# Patient Record
Sex: MALE | Race: WHITE | HISPANIC OR LATINO | Employment: FULL TIME | URBAN - METROPOLITAN AREA
[De-identification: names, ages, dates, MRNs, and addresses within clinical notes are randomized per-mention and may not be internally consistent; named-entity substitution may affect disease eponyms.]

---

## 2017-11-25 ENCOUNTER — WALK IN (OUTPATIENT)
Dept: URGENT CARE | Age: 57
End: 2017-11-25

## 2017-11-25 VITALS — OXYGEN SATURATION: 97 % | TEMPERATURE: 98.3 F | HEART RATE: 87 BPM

## 2017-11-25 DIAGNOSIS — J02.9 PHARYNGITIS, UNSPECIFIED ETIOLOGY: Primary | ICD-10-CM

## 2017-11-25 LAB — S PYO AG THROAT QL: POSITIVE

## 2017-11-25 PROCEDURE — 99213 OFFICE O/P EST LOW 20 MIN: CPT | Performed by: NURSE PRACTITIONER

## 2017-11-25 PROCEDURE — 87880 STREP A ASSAY W/OPTIC: CPT | Performed by: NURSE PRACTITIONER

## 2017-11-25 RX ORDER — PENICILLIN V POTASSIUM 500 MG/1
500 TABLET ORAL 2 TIMES DAILY
Qty: 20 TABLET | Refills: 0 | Status: SHIPPED | OUTPATIENT
Start: 2017-11-25 | End: 2017-12-05

## 2018-02-06 ENCOUNTER — LAB SERVICES (OUTPATIENT)
Dept: OTHER | Age: 58
End: 2018-02-06

## 2018-02-06 ENCOUNTER — CHARTING TRANS (OUTPATIENT)
Dept: OTHER | Age: 58
End: 2018-02-06

## 2018-02-06 ASSESSMENT — PAIN SCALES - GENERAL: PAINLEVEL_OUTOF10: 0

## 2018-02-19 ENCOUNTER — CHARTING TRANS (OUTPATIENT)
Dept: OTHER | Age: 58
End: 2018-02-19

## 2018-02-19 LAB
ANION GAP SERPL CALC-SCNC: 13 MMOL/L (ref 10–20)
BUN SERPL-MCNC: 14 MG/DL (ref 6–20)
BUN/CREAT SERPL: 13 (ref 7–25)
CALCIUM SERPL-MCNC: 9.3 MG/DL (ref 8.4–10.2)
CHLORIDE SERPL-SCNC: 105 MMOL/L (ref 98–107)
CO2 SERPL-SCNC: 28 MMOL/L (ref 21–32)
CREAT SERPL-MCNC: 1.05 MG/DL (ref 0.67–1.17)
CREATININE RANDOM URINE: 215 MG/DL
GLUCOSE SERPL-MCNC: 86 MG/DL (ref 65–99)
HBA1C MFR BLD: 5.9 % (ref 4.5–5.6)
LENGTH OF FAST TIME PATIENT: 12 HRS
MICROALBUMIN UR-MCNC: 15.8 MG/DL
MICROALBUMIN/CREAT UR: 73.5 MCG/MG
POTASSIUM SERPL-SCNC: 4.4 MMOL/L (ref 3.4–5.1)
PSA SERPL-MCNC: 5.17 NG/ML
SODIUM SERPL-SCNC: 142 MMOL/L (ref 135–145)

## 2018-02-19 ASSESSMENT — PAIN SCALES - GENERAL: PAINLEVEL_OUTOF10: 0

## 2018-03-01 ENCOUNTER — CHARTING TRANS (OUTPATIENT)
Dept: OTHER | Age: 58
End: 2018-03-01

## 2018-05-01 ENCOUNTER — CHARTING TRANS (OUTPATIENT)
Dept: OTHER | Age: 58
End: 2018-05-01

## 2018-12-02 VITALS
HEART RATE: 100 BPM | BODY MASS INDEX: 30.53 KG/M2 | WEIGHT: 190 LBS | DIASTOLIC BLOOD PRESSURE: 78 MMHG | HEIGHT: 66 IN | SYSTOLIC BLOOD PRESSURE: 102 MMHG

## 2018-12-02 VITALS
TEMPERATURE: 96.4 F | WEIGHT: 195.77 LBS | HEART RATE: 80 BPM | DIASTOLIC BLOOD PRESSURE: 110 MMHG | TEMPERATURE: 96.3 F | OXYGEN SATURATION: 98 % | SYSTOLIC BLOOD PRESSURE: 160 MMHG | DIASTOLIC BLOOD PRESSURE: 100 MMHG | SYSTOLIC BLOOD PRESSURE: 155 MMHG | RESPIRATION RATE: 18 BRPM | OXYGEN SATURATION: 97 % | RESPIRATION RATE: 18 BRPM | HEIGHT: 66 IN | HEART RATE: 76 BPM

## 2018-12-02 VITALS
BODY MASS INDEX: 31.1 KG/M2 | DIASTOLIC BLOOD PRESSURE: 110 MMHG | HEIGHT: 66 IN | HEART RATE: 84 BPM | SYSTOLIC BLOOD PRESSURE: 176 MMHG | WEIGHT: 193.5 LBS

## 2025-02-21 ENCOUNTER — HOSPITAL ENCOUNTER (EMERGENCY)
Facility: HOSPITAL | Age: 65
Discharge: HOME | End: 2025-02-21
Attending: EMERGENCY MEDICINE
Payer: COMMERCIAL

## 2025-02-21 ENCOUNTER — APPOINTMENT (OUTPATIENT)
Dept: GENERAL RADIOLOGY | Facility: HOSPITAL | Age: 65
End: 2025-02-21
Attending: EMERGENCY MEDICINE
Payer: COMMERCIAL

## 2025-02-21 VITALS
OXYGEN SATURATION: 95 % | RESPIRATION RATE: 14 BRPM | WEIGHT: 180 LBS | DIASTOLIC BLOOD PRESSURE: 108 MMHG | HEART RATE: 66 BPM | SYSTOLIC BLOOD PRESSURE: 169 MMHG | BODY MASS INDEX: 29.99 KG/M2 | HEIGHT: 65 IN | TEMPERATURE: 98 F

## 2025-02-21 DIAGNOSIS — R03.0 ELEVATED BLOOD PRESSURE READING: Primary | ICD-10-CM

## 2025-02-21 LAB
ALBUMIN SERPL-MCNC: 4.5 G/DL (ref 3.2–4.8)
ALBUMIN/GLOB SERPL: 1.3 {RATIO} (ref 1–2)
ALP LIVER SERPL-CCNC: 123 U/L
ALT SERPL-CCNC: 11 U/L
ANION GAP SERPL CALC-SCNC: 9 MMOL/L (ref 0–18)
AST SERPL-CCNC: 15 U/L (ref ?–34)
ATRIAL RATE: 75 BPM
BASOPHILS # BLD AUTO: 0.05 X10(3) UL (ref 0–0.2)
BASOPHILS NFR BLD AUTO: 0.6 %
BILIRUB SERPL-MCNC: 0.3 MG/DL (ref 0.2–1.1)
BUN BLD-MCNC: 24 MG/DL (ref 9–23)
BUN/CREAT SERPL: 19.4 (ref 10–20)
CALCIUM BLD-MCNC: 9.1 MG/DL (ref 8.7–10.4)
CHLORIDE SERPL-SCNC: 106 MMOL/L (ref 98–112)
CO2 SERPL-SCNC: 26 MMOL/L (ref 21–32)
CREAT BLD-MCNC: 1.24 MG/DL
DEPRECATED RDW RBC AUTO: 43.6 FL (ref 35.1–46.3)
EGFRCR SERPLBLD CKD-EPI 2021: 65 ML/MIN/1.73M2 (ref 60–?)
EOSINOPHIL # BLD AUTO: 0.17 X10(3) UL (ref 0–0.7)
EOSINOPHIL NFR BLD AUTO: 2.2 %
ERYTHROCYTE [DISTWIDTH] IN BLOOD BY AUTOMATED COUNT: 13.3 % (ref 11–15)
GLOBULIN PLAS-MCNC: 3.4 G/DL (ref 2–3.5)
GLUCOSE BLD-MCNC: 94 MG/DL (ref 70–99)
HCT VFR BLD AUTO: 49.8 %
HGB BLD-MCNC: 16.1 G/DL
IMM GRANULOCYTES # BLD AUTO: 0.03 X10(3) UL (ref 0–1)
IMM GRANULOCYTES NFR BLD: 0.4 %
LYMPHOCYTES # BLD AUTO: 1.53 X10(3) UL (ref 1–4)
LYMPHOCYTES NFR BLD AUTO: 19.4 %
MCH RBC QN AUTO: 28.5 PG (ref 26–34)
MCHC RBC AUTO-ENTMCNC: 32.3 G/DL (ref 31–37)
MCV RBC AUTO: 88.3 FL
MONOCYTES # BLD AUTO: 0.75 X10(3) UL (ref 0.1–1)
MONOCYTES NFR BLD AUTO: 9.5 %
NEUTROPHILS # BLD AUTO: 5.35 X10 (3) UL (ref 1.5–7.7)
NEUTROPHILS # BLD AUTO: 5.35 X10(3) UL (ref 1.5–7.7)
NEUTROPHILS NFR BLD AUTO: 67.9 %
OSMOLALITY SERPL CALC.SUM OF ELEC: 296 MOSM/KG (ref 275–295)
P AXIS: 72 DEGREES
P-R INTERVAL: 142 MS
PLATELET # BLD AUTO: 296 10(3)UL (ref 150–450)
POTASSIUM SERPL-SCNC: 3.9 MMOL/L (ref 3.5–5.1)
PROT SERPL-MCNC: 7.9 G/DL (ref 5.7–8.2)
Q-T INTERVAL: 374 MS
QRS DURATION: 78 MS
QTC CALCULATION (BEZET): 417 MS
R AXIS: 3 DEGREES
RBC # BLD AUTO: 5.64 X10(6)UL
SODIUM SERPL-SCNC: 141 MMOL/L (ref 136–145)
T AXIS: 245 DEGREES
TROPONIN I SERPL HS-MCNC: 23 NG/L
VENTRICULAR RATE: 75 BPM
WBC # BLD AUTO: 7.9 X10(3) UL (ref 4–11)

## 2025-02-21 PROCEDURE — 85025 COMPLETE CBC W/AUTO DIFF WBC: CPT | Performed by: EMERGENCY MEDICINE

## 2025-02-21 PROCEDURE — 71045 X-RAY EXAM CHEST 1 VIEW: CPT | Performed by: EMERGENCY MEDICINE

## 2025-02-21 PROCEDURE — 93010 ELECTROCARDIOGRAM REPORT: CPT

## 2025-02-21 PROCEDURE — 36415 COLL VENOUS BLD VENIPUNCTURE: CPT

## 2025-02-21 PROCEDURE — 80053 COMPREHEN METABOLIC PANEL: CPT | Performed by: EMERGENCY MEDICINE

## 2025-02-21 PROCEDURE — 99284 EMERGENCY DEPT VISIT MOD MDM: CPT

## 2025-02-21 PROCEDURE — 84484 ASSAY OF TROPONIN QUANT: CPT | Performed by: EMERGENCY MEDICINE

## 2025-02-21 PROCEDURE — 93005 ELECTROCARDIOGRAM TRACING: CPT

## 2025-02-21 PROCEDURE — 99285 EMERGENCY DEPT VISIT HI MDM: CPT

## 2025-02-21 RX ORDER — AMLODIPINE BESYLATE 5 MG/1
5 TABLET ORAL ONCE
Status: COMPLETED | OUTPATIENT
Start: 2025-02-21 | End: 2025-02-21

## 2025-02-21 RX ORDER — AMLODIPINE BESYLATE 5 MG/1
5 TABLET ORAL DAILY
Qty: 5 TABLET | Refills: 0 | Status: SHIPPED | OUTPATIENT
Start: 2025-02-21 | End: 2025-02-26

## 2025-02-21 NOTE — ED PROVIDER NOTES
[facility]  Emergency Department Attending Note     Chief Complaint:   Chief Complaint   Patient presents with    Hypertension     HISTORY OF PRESENT ILLNESS:   Cory Turner is a 64 year old male who presents to the ED with medical history including hypertension noncompliant on all medications for at least 9 months presents with a complaint of elevated blood pressure at home.  Patient states he checked his blood pressure at home and it was quite elevated but cannot remember what the reading was exactly.  Triage complaint mentions headache and dizziness however the patient denies this to me.  He states that in the past he has had lightheadedness spells but nothing recent.  She denies any chest pain or dyspnea pleuritic or exertional complaints.  Denies any somatic complaint at this time.  Feels well.     MEDICAL & SOCIAL HISTORY:   Past Medical History:    Essential hypertension    History reviewed. No pertinent surgical history.   Social History     Socioeconomic History    Marital status: Single    Allergies[1]   Current Outpatient Medications   Medication Sig Dispense Refill    amLODIPine 5 MG Oral Tab Take 1 tablet (5 mg total) by mouth daily for 5 days. 5 tablet 0          REVIEW OF SYSTEMS   A 10 point review of systems was completed and is negative except as listed in history of present illness      PHYSICAL EXAM   Vitals: BP (!) 152/103   Pulse 74   Temp 98.2 °F (36.8 °C) (Oral)   Resp 18   Ht 165.1 cm (5' 5\")   Wt 81.6 kg   SpO2 94%   BMI 29.95 kg/m²   BP (!) 152/103   Pulse 74   Temp 98.2 °F (36.8 °C) (Oral)   Resp 18   Ht 165.1 cm (5' 5\")   Wt 81.6 kg   SpO2 94%   BMI 29.95 kg/m²     General: A&Ox3, NAD  Constitutional: Well developed, well nourished, nontoxic  Head: atraumatic, normocephalic   Eyes: conjuctiva clear, no icterus, PERRL, EOMI, vision grossly normal  Ears: normal external appearance, no drainage  Nose:  Atraumatic, no swelling, no drainage, nares patent  Throat:  Moist  pink mucous membranes, airway is patent  Neck:  Soft supple, no masses, no tracheal deviation, no stridor  Chest:  No bruising or abrasions, no tenderness, no deformity  Cardiac:  Regular rate and rhythm  Lung:  No distress, no retractions. Clear to auscultation bilaterally, no w/r/r  Abdomen:  Soft, nontender, nondistended, normal BS  Back: No stepoff/deformity  Extremities: FROM all ext, no deformities, intact equal peripheral pulses, no cyanosis or edema  Neuro: No facial droop, no slurred speech, moving all extremities freely, SILT to the bilateral upper and lower extremities  Psych: A&Ox3, normal affect, cooperative, calm  Skin: No rash, no petechiae/purpura, warm, dry      RESULTS  LABS:   Results for orders placed or performed during the hospital encounter of 02/21/25   EKG 12 Lead    Collection Time: 02/21/25  9:04 AM   Result Value Ref Range    Ventricular rate 75 BPM    Atrial rate 75 BPM    P-R Interval 142 ms    QRS Duration 78 ms    Q-T Interval 374 ms    QTC Calculation (Bezet) 417 ms    P Axis 72 degrees    R Axis 3 degrees    T Axis 245 degrees   Comp Metabolic Panel (14)    Collection Time: 02/21/25  9:37 AM   Result Value Ref Range    Glucose 94 70 - 99 mg/dL    Sodium 141 136 - 145 mmol/L    Potassium 3.9 3.5 - 5.1 mmol/L    Chloride 106 98 - 112 mmol/L    CO2 26.0 21.0 - 32.0 mmol/L    Anion Gap 9 0 - 18 mmol/L    BUN 24 (H) 9 - 23 mg/dL    Creatinine 1.24 0.70 - 1.30 mg/dL    BUN/CREA Ratio 19.4 10.0 - 20.0    Calcium, Total 9.1 8.7 - 10.4 mg/dL    Calculated Osmolality 296 (H) 275 - 295 mOsm/kg    eGFR-Cr 65 >=60 mL/min/1.73m2    ALT 11 10 - 49 U/L    AST 15 <34 U/L    Alkaline Phosphatase 123 (H) 45 - 117 U/L    Bilirubin, Total 0.3 0.2 - 1.1 mg/dL    Total Protein 7.9 5.7 - 8.2 g/dL    Albumin 4.5 3.2 - 4.8 g/dL    Globulin  3.4 2.0 - 3.5 g/dL    A/G Ratio 1.3 1.0 - 2.0   CBC With Differential With Platelet    Collection Time: 02/21/25  9:37 AM   Result Value Ref Range    WBC 7.9 4.0 - 11.0  x10(3) uL    RBC 5.64 3.80 - 5.80 x10(6)uL    HGB 16.1 13.0 - 17.5 g/dL    HCT 49.8 39.0 - 53.0 %    MCV 88.3 80.0 - 100.0 fL    MCH 28.5 26.0 - 34.0 pg    MCHC 32.3 31.0 - 37.0 g/dL    RDW-SD 43.6 35.1 - 46.3 fL    RDW 13.3 11.0 - 15.0 %    .0 150.0 - 450.0 10(3)uL    Neutrophil Absolute Prelim 5.35 1.50 - 7.70 x10 (3) uL    Neutrophil Absolute 5.35 1.50 - 7.70 x10(3) uL    Lymphocyte Absolute 1.53 1.00 - 4.00 x10(3) uL    Monocyte Absolute 0.75 0.10 - 1.00 x10(3) uL    Eosinophil Absolute 0.17 0.00 - 0.70 x10(3) uL    Basophil Absolute 0.05 0.00 - 0.20 x10(3) uL    Immature Granulocyte Absolute 0.03 0.00 - 1.00 x10(3) uL    Neutrophil % 67.9 %    Lymphocyte % 19.4 %    Monocyte % 9.5 %    Eosinophil % 2.2 %    Basophil % 0.6 %    Immature Granulocyte % 0.4 %   Troponin I (High Sensitivity)    Collection Time: 02/21/25  9:37 AM   Result Value Ref Range    Troponin I (High Sensitivity) 23 <=53 ng/L   RAINBOW DRAW LAVENDER    Collection Time: 02/21/25  9:37 AM   Result Value Ref Range    Hold Lavender Auto Resulted    RAINBOW DRAW LIGHT GREEN    Collection Time: 02/21/25  9:37 AM   Result Value Ref Range    Hold Lt Green Auto Resulted    RAINBOW DRAW BLUE    Collection Time: 02/21/25  9:37 AM   Result Value Ref Range    Hold Blue Auto Resulted          IMAGING: XR CHEST AP PORTABLE  (CPT=71045)    Result Date: 2/21/2025  CONCLUSION: Slight cardiac enlargement but no radiographic evidence of acute cardiopulmonary process.    Dictated by (CST): Eusebio Solo MD on 2/21/2025 at 10:33 AM     Finalized by (CST): Eusebio Solo MD on 2/21/2025 at 10:34 AM           I personally reviewed the radiology study and my finding were no pneumothorax      Procedures:   Procedures    EKG: Normal sinus rhythm with a normal rate of 75, normal intervals, normal QRS, nonspecific ST-T wave changes without overt signs of acute ischemia, no old immediately available comparison     ED COURSE          Re-Evaluation: Improved       Disposition & Plan:   Clinical Impression/Final Diagnosis:   1. Elevated blood pressure reading        Medical Decision Making: Patient presents to the ED with elevated blood pressure without any clinical signs or symptoms of end organ damage. I had a long discussion with the patient regarding the risks of uncontrolled HTN, including but not limited to the risk of great vessel rupture and the long term risks including CAD and kidney injury, and the patient verbalized understanding. I discussed that the patient MUST follow up TOMORROW with PMD and also patient is able to verbalize understanding of my extensive return instructions. I do not feel it would be helpful (and in fact may be harmful) to acutely lower the blood pressure in the ED, and as per ACEP guidelines I feel that close follow up is the best and safest course of action.      Medical Decision Making  Amount and/or Complexity of Data Reviewed  External Data Reviewed: notes.  Labs: ordered. Decision-making details documented in ED Course.     Details: Troponin is low making acute coronary syndrome much less likely  Radiology: ordered and independent interpretation performed. Decision-making details documented in ED Course.     Details: Slight cardiomegaly noted, may be related the patient's longstanding untreated hypertension, no acute physical complaints  ECG/medicine tests: ordered and independent interpretation performed. Decision-making details documented in ED Course.     Details: No overtly ischemic findings    Risk  OTC drugs.  Prescription drug management.  Decision regarding hospitalization.  Risk Details: Broad differential considered including but not limited to symptomatic hypertension, malignant hypertension, acute coronary syndrome low suspicion for these given the workup, exam and history        *Please note that in the presenting to the emergency department, illness/injury that poses a threat to life or function is considered during this  patient's initial evaluation.    The complexity of this visit is therefore inherently more complex given the need to consider life threatening pathology prior to any other etiology for this patient's visit.    The medical decision above exemplifies this rationale.     Disposition: Discharge  There are no disposition comments on file for this visit.       MDM       Disposition and Plan     Clinical Impression:  1. Elevated blood pressure reading         Disposition:  Discharge  2/21/2025 11:01 am    Follow-up:  Beck Zamora MD  [address] [facility] [facility] [address]  [phone]    Schedule an appointment as soon as possible for a visit in 1 day(s)      [facility]  [address]  [place] [address]  [phone]  Schedule an appointment as soon as possible for a visit in 2 day(s)      We recommend that you schedule follow up care with a primary care provider within the next three months to obtain basic health screening including reassessment of your blood pressure.      Medications Prescribed:  Current Discharge Medication List        START taking these medications    Details   amLODIPine 5 MG Oral Tab Take 1 tablet (5 mg total) by mouth daily for 5 days.  Qty: 5 tablet, Refills: 0                 Supplementary Documentation:                                                     This note was generated in part using voice recognition dictation technology. The report was reviewed by this physician but still may have unintentional errors due to inherent limitations of voice recognition technology. All times are estimates.         [1]   Allergies  Allergen Reactions    Pollen ITCHING

## 2025-02-21 NOTE — ED INITIAL ASSESSMENT (HPI)
Per pt his BP was elevated at home, per pt he can't remember what was the reading.  Pt with headache, dizziness.  Pt is A/Ox 4, breathing unlabored,

## 2025-03-04 ENCOUNTER — OFFICE VISIT (OUTPATIENT)
Dept: FAMILY MEDICINE CLINIC | Facility: CLINIC | Age: 65
End: 2025-03-04

## 2025-03-04 VITALS
OXYGEN SATURATION: 97 % | HEIGHT: 65 IN | DIASTOLIC BLOOD PRESSURE: 98 MMHG | BODY MASS INDEX: 31.99 KG/M2 | WEIGHT: 192 LBS | SYSTOLIC BLOOD PRESSURE: 148 MMHG | HEART RATE: 82 BPM

## 2025-03-04 DIAGNOSIS — I63.9 CEREBROVASCULAR ACCIDENT (CVA), UNSPECIFIED MECHANISM (HCC): ICD-10-CM

## 2025-03-04 DIAGNOSIS — F33.1 MODERATE EPISODE OF RECURRENT MAJOR DEPRESSIVE DISORDER (HCC): ICD-10-CM

## 2025-03-04 DIAGNOSIS — I10 PRIMARY HYPERTENSION: Primary | ICD-10-CM

## 2025-03-04 PROCEDURE — 99204 OFFICE O/P NEW MOD 45 MIN: CPT

## 2025-03-04 RX ORDER — ENALAPRIL MALEATE 20 MG/1
20 TABLET ORAL DAILY
COMMUNITY
Start: 2023-08-04 | End: 2025-03-04

## 2025-03-04 RX ORDER — ATORVASTATIN CALCIUM 80 MG/1
80 TABLET, FILM COATED ORAL DAILY
Qty: 30 TABLET | Refills: 0 | Status: SHIPPED | OUTPATIENT
Start: 2025-03-04 | End: 2025-04-03

## 2025-03-04 RX ORDER — MICONAZOLE NITRATE 20 MG/G
CREAM TOPICAL AS NEEDED
COMMUNITY
Start: 2022-10-28 | End: 2025-03-04

## 2025-03-04 RX ORDER — MICONAZOLE NITRATE 20 MG/G
1 CREAM TOPICAL AS NEEDED
Qty: 35 G | Refills: 0 | Status: SHIPPED | OUTPATIENT
Start: 2025-03-04

## 2025-03-04 RX ORDER — HYDROCHLOROTHIAZIDE 25 MG/1
25 TABLET ORAL DAILY
Qty: 30 TABLET | Refills: 0 | Status: SHIPPED | OUTPATIENT
Start: 2025-03-04 | End: 2025-04-03

## 2025-03-04 RX ORDER — ATORVASTATIN CALCIUM 80 MG/1
80 TABLET, FILM COATED ORAL DAILY
COMMUNITY
Start: 2023-08-04 | End: 2025-03-04

## 2025-03-04 RX ORDER — LORATADINE 10 MG/1
10 TABLET ORAL DAILY
COMMUNITY
Start: 2023-08-04

## 2025-03-04 RX ORDER — ASPIRIN 81 MG/1
81 TABLET ORAL DAILY
Qty: 30 TABLET | Refills: 0 | Status: SHIPPED | OUTPATIENT
Start: 2025-03-04 | End: 2025-04-03

## 2025-03-04 RX ORDER — ASPIRIN 81 MG/1
81 TABLET ORAL
COMMUNITY
Start: 2023-08-04 | End: 2025-03-04

## 2025-03-04 RX ORDER — ENALAPRIL MALEATE 20 MG/1
20 TABLET ORAL DAILY
Qty: 30 TABLET | Refills: 0 | Status: SHIPPED | OUTPATIENT
Start: 2025-03-04 | End: 2025-04-03

## 2025-03-04 RX ORDER — HYDROCHLOROTHIAZIDE 25 MG/1
25 TABLET ORAL DAILY
COMMUNITY
Start: 2023-08-04 | End: 2025-03-04

## 2025-03-04 RX ORDER — FLUTICASONE PROPIONATE 50 MCG
100 SPRAY, SUSPENSION (ML) NASAL
COMMUNITY
Start: 2023-08-04

## 2025-03-04 RX ORDER — ALBUTEROL SULFATE 90 UG/1
90 INHALANT RESPIRATORY (INHALATION) EVERY 6 HOURS PRN
COMMUNITY

## 2025-03-04 NOTE — ASSESSMENT & PLAN NOTE
Orders:    aspirin 81 MG Oral Tab EC; Take 1 tablet (81 mg total) by mouth daily.    atorvastatin 80 MG Oral Tab; Take 1 tablet (80 mg total) by mouth daily.    Cardio Referral - Internal

## 2025-03-04 NOTE — ASSESSMENT & PLAN NOTE
-Advised patient to take medication as directed and follow up with psychologist/psychiatrist/counselor  -Education provided on daily exercise, and proper sleep  -Discussed about decreasing the amount of caffeine daily and cut out alcohol  -Patient can reach out to family, friends, [employer] groups for support as needed     Orders:    FLUoxetine 20 MG Oral Cap; Take 1 capsule (20 mg total) by mouth daily.

## 2025-03-04 NOTE — PROGRESS NOTES
Subjective:   Cory Turner is a 64 year old male who presents for ER F/U (Pt was in the Er on 2/21 for High blood pressure pt states he has been monitoring his bp at home ).     New patient is here for follow up from the ER for high blood pressure and was diagnosed with HTN. Patient is being treated with amlodipine. Patient states symptoms are better.  See ED notes for details of evaluation and treatment in the ED.    Patient states that he was prescribed medication for his htn in the past but is noncompliant with all his medications. Patient states that he was taking his blood pressure 2-3 days at home. Patient said his blood pressure have been high but 175/69 and he states he was lightheaded and his body felt aching. Patient denies chest pain, sob, cough, congestion, fever, chills, abdominal pain, nausea or vomiting.     Patient blood pressure at start of visit was 156/100. His blood pressure at end of visit was 148/98. Patient states that he has not been taking his medication and is not watching his diet of exercise.        History/Other:      Chief Complaint Reviewed and Verified  Nursing Notes Reviewed and   Verified  Tobacco Reviewed  Allergies Reviewed  Medications Reviewed    Problem List Reviewed  Medical History Reviewed  Surgical History   Reviewed  Family History Reviewed  Social History Reviewed           Tobacco:  He has never smoked tobacco.      Current Outpatient Medications   Medication Sig Dispense Refill    aspirin 81 MG Oral Tab EC Take 1 tablet (81 mg total) by mouth daily. 30 tablet 0    atorvastatin 80 MG Oral Tab Take 1 tablet (80 mg total) by mouth daily. 30 tablet 0    enalapril 20 MG Oral Tab Take 1 tablet (20 mg total) by mouth daily. 30 tablet 0    hydroCHLOROthiazide 25 MG Oral Tab Take 1 tablet (25 mg total) by mouth daily. 30 tablet 0    Miconazole Nitrate 2 % External Cream Apply 1 Application topically as needed. 35 g 0    FLUoxetine 20 MG Oral Cap Take 1 capsule (20 mg  total) by mouth daily. 30 capsule 0    loratadine 10 MG Oral Tab Take 1 tablet (10 mg total) by mouth daily. (Patient not taking: Reported on 3/4/2025)      fluticasone propionate 50 MCG/ACT Nasal Suspension 2 sprays. (Patient not taking: Reported on 3/4/2025)      albuterol 108 (90 Base) MCG/ACT Inhalation Aero Soln Inhale 90 puffs into the lungs every 6 (six) hours as needed for Wheezing. (Patient not taking: Reported on 3/4/2025)         Allergies[1]        Review of Systems   Constitutional: Negative.  Negative for activity change and fatigue.   HENT: Negative.  Negative for congestion, ear pain, rhinorrhea and sneezing.    Eyes: Negative.  Negative for redness.   Respiratory: Negative.  Negative for cough, shortness of breath and wheezing.    Cardiovascular: Negative.  Negative for chest pain.   Gastrointestinal: Negative.  Negative for abdominal pain, constipation, diarrhea, nausea and vomiting.   Endocrine: Negative.    Genitourinary: Negative.  Negative for difficulty urinating and frequency.   Musculoskeletal: Negative.  Negative for back pain, joint swelling and myalgias.   Skin: Negative.  Negative for rash.   Allergic/Immunologic: Negative.    Neurological: Negative.  Negative for dizziness, syncope, light-headedness and headaches.   Hematological: Negative.    Psychiatric/Behavioral: Negative.           Objective:   BP (!) 162/104   Pulse 82   Ht 5' 5\" (1.651 m)   Wt 192 lb (87.1 kg)   SpO2 97%   BMI 31.95 kg/m²  Estimated body mass index is 31.95 kg/m² as calculated from the following:    Height as of this encounter: 5' 5\" (1.651 m).    Weight as of this encounter: 192 lb (87.1 kg).      Physical Exam  Vitals and nursing note reviewed.   Constitutional:       Appearance: Normal appearance. He is normal weight.   HENT:      Head: Normocephalic and atraumatic.      Right Ear: Tympanic membrane normal.      Left Ear: Tympanic membrane normal.      Nose: Nose normal.      Mouth/Throat:      Mouth:  Mucous membranes are moist.      Pharynx: Oropharynx is clear.   Eyes:      Extraocular Movements: Extraocular movements intact.      Conjunctiva/sclera: Conjunctivae normal.      Pupils: Pupils are equal, round, and reactive to light.   Cardiovascular:      Rate and Rhythm: Normal rate and regular rhythm.      Pulses: Normal pulses.      Heart sounds: Normal heart sounds.   Pulmonary:      Effort: Pulmonary effort is normal.      Breath sounds: Normal breath sounds.   Abdominal:      General: Abdomen is flat. Bowel sounds are normal.      Palpations: Abdomen is soft.   Musculoskeletal:         General: Normal range of motion.      Cervical back: Normal range of motion and neck supple.   Skin:     General: Skin is warm and dry.   Neurological:      General: No focal deficit present.      Mental Status: He is alert and oriented to person, place, and time. Mental status is at baseline.   Psychiatric:         Mood and Affect: Mood normal.         Behavior: Behavior normal.         Thought Content: Thought content normal.         Judgment: Judgment normal.         Assessment & Plan:     Assessment & Plan  Primary hypertension  -Discussed with patient about how to monitoring BP at home and continue medication daily.  -Advised low salt diet.  -Eat less of canned , frozen, dried, packaged and fast food.  -Limit caffeine, alcohol. No smoking.  -Exercise regularly, at least 20 minutes 3 times a week.  -Maintain healthy body weight.   -Avoid stress.  -Lifestyle modification has potential for  improving BP control   Orders:    enalapril 20 MG Oral Tab; Take 1 tablet (20 mg total) by mouth daily.    hydroCHLOROthiazide 25 MG Oral Tab; Take 1 tablet (25 mg total) by mouth daily.    Cardio Referral - Internal    Moderate episode of recurrent major depressive disorder (HCC)  -Advised patient to take medication as directed and follow up with psychologist/psychiatrist/counselor  -Education provided on daily exercise, and proper  sleep  -Discussed about decreasing the amount of caffeine daily and cut out alcohol  -Patient can reach out to family, friends, [employer] groups for support as needed     Orders:    FLUoxetine 20 MG Oral Cap; Take 1 capsule (20 mg total) by mouth daily.    Cerebrovascular accident (CVA), unspecified mechanism (HCC)    Orders:    aspirin 81 MG Oral Tab EC; Take 1 tablet (81 mg total) by mouth daily.    atorvastatin 80 MG Oral Tab; Take 1 tablet (80 mg total) by mouth daily.    Cardio Referral - Internal     Medication use, effects and side effects discussed in detail with patient. The patient indicated understanding of the diagnosis and agreed with the plan of care.    Return in about 2 weeks (around 3/18/2025) for Annual Physical, Blood pressure.    MICHELLE Mercado       [1]   Allergies  Allergen Reactions    Pollen ITCHING

## 2025-03-04 NOTE — ASSESSMENT & PLAN NOTE
-Discussed with patient about how to monitoring BP at home and continue medication daily.  -Advised low salt diet.  -Eat less of canned , frozen, dried, packaged and fast food.  -Limit caffeine, alcohol. No smoking.  -Exercise regularly, at least 20 minutes 3 times a week.  -Maintain healthy body weight.   -Avoid stress.  -Lifestyle modification has potential for  improving BP control   Orders:    enalapril 20 MG Oral Tab; Take 1 tablet (20 mg total) by mouth daily.    hydroCHLOROthiazide 25 MG Oral Tab; Take 1 tablet (25 mg total) by mouth daily.    Cardio Referral - Internal

## (undated) NOTE — LETTER
3/4/2025          To Whom It May Concern:    Cory Turner is currently under my medical care and may not return to work at this time.    Please excuse Cory for 2 days.  He may return to work on 03/06/2025.  Activity is restricted as follows: none.    If you require additional information please contact our office.        Sincerely,    MICHELLE Mercado           Document generated by:  MICHELLE Mercado